# Patient Record
Sex: MALE | Race: WHITE | Employment: STUDENT | URBAN - METROPOLITAN AREA
[De-identification: names, ages, dates, MRNs, and addresses within clinical notes are randomized per-mention and may not be internally consistent; named-entity substitution may affect disease eponyms.]

---
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
## 2021-12-20 PROBLEM — J35.1 TONSILLAR HYPERTROPHY: Status: ACTIVE | Noted: 2021-12-20

## 2022-06-14 PROCEDURE — 88304 TISSUE EXAM BY PATHOLOGIST: CPT | Performed by: OTOLARYNGOLOGY

## 2022-06-15 ENCOUNTER — LAB REQUISITION (OUTPATIENT)
Dept: LAB | Facility: HOSPITAL | Age: 22
End: 2022-06-15
Payer: COMMERCIAL

## 2022-06-15 DIAGNOSIS — J35.1 HYPERTROPHY OF TONSILS: ICD-10-CM

## 2022-06-15 DIAGNOSIS — J35.01 CHRONIC TONSILLITIS: ICD-10-CM

## (undated) NOTE — LETTER
[facility] [place]  [address] [address]  Dept: [phone]  Dept Fax: [phone]         September 23, 2017    Patient: Rochelle Singh   YOB: 2000   Date of Visit: 9/23/2017       To Whom It May Conc